# Patient Record
Sex: FEMALE | Race: WHITE | NOT HISPANIC OR LATINO | URBAN - METROPOLITAN AREA
[De-identification: names, ages, dates, MRNs, and addresses within clinical notes are randomized per-mention and may not be internally consistent; named-entity substitution may affect disease eponyms.]

---

## 2023-03-08 ENCOUNTER — OFFICE VISIT (OUTPATIENT)
Dept: OBSTETRICS AND GYNECOLOGY | Age: 38
End: 2023-03-08
Payer: COMMERCIAL

## 2023-03-08 VITALS
DIASTOLIC BLOOD PRESSURE: 66 MMHG | BODY MASS INDEX: 27.83 KG/M2 | HEIGHT: 61 IN | WEIGHT: 147.4 LBS | SYSTOLIC BLOOD PRESSURE: 118 MMHG

## 2023-03-08 DIAGNOSIS — N64.52 NIPPLE DISCHARGE: Primary | ICD-10-CM

## 2023-03-08 PROCEDURE — 99203 OFFICE O/P NEW LOW 30 MIN: CPT | Performed by: STUDENT IN AN ORGANIZED HEALTH CARE EDUCATION/TRAINING PROGRAM

## 2023-03-08 NOTE — PROGRESS NOTES
[facility]   Obstetrics and Gynecology   New Gynecology Visit    3/8/2023    Patient: Mar Paiz          MR#:[id number]    History of Present Illness    Chief Complaint   Patient presents with   • Establish Care     NGYN - Ref for right nipple discharge, Breast U/S done 2022, Dx MG 10/14/2022, Last pap  nml       37 y.o. female  who presents for presents with nipple discharge.  She was previously seen at [place] for this issue and wants a second opinion.  Started 10/2022.  She reports right breast infection that presented as painful breast with spontaneous bloody and then clear nipple discharge.   Symptoms resolved with antibiotics.  Breast imaging normal.  It then recurred in Dec and repeat imaging was still normal.  Stopped breastfeeding 1.5 yrs ago.    Studies reviewed:  US Breast Limited Right (2022 09:46)  US Breast Limited Right (10/14/2022 12:00)  Mammo Diagnostic Bilateral With CAD (10/14/2022 11:38)      Obstetric History:  OB History        4    Para   2    Term   1       1    AB   2    Living   2       SAB   1    IAB        Ectopic        Molar        Multiple        Live Births   2               Menstrual History:     Patient's last menstrual period was 2023 (exact date).       ________________________________________  There is no problem list on file for this patient.    Past Medical History:   Diagnosis Date   • No pertinent past medical history      Past Surgical History:   Procedure Laterality Date   •  SECTION      x2   • TUBAL ABDOMINAL LIGATION       Social History     Tobacco Use   Smoking Status Never   • Passive exposure: Never   Smokeless Tobacco Never     Family History   Problem Relation Age of Onset   • Prostate cancer Father    • No Known Problems Mother    • Breast cancer Neg Hx    • Ovarian cancer Neg Hx    • Uterine cancer Neg Hx    • Colon cancer Neg Hx      Prior to Admission medications    Not on File  "    ________________________________________    The following portions of the patient's history were reviewed and updated as appropriate: allergies, current medications, past family history, past medical history, past social history, past surgical history and problem list.    Review of Systems   All other systems reviewed and are negative.           Objective     /66   Ht 154.9 cm (61\")   Wt 66.9 kg (147 lb 6.4 oz)   LMP 02/27/2023 (Exact Date)   Breastfeeding No   BMI 27.85 kg/m²    BP Readings from Last 3 Encounters:   03/08/23 118/66      Wt Readings from Last 3 Encounters:   03/08/23 66.9 kg (147 lb 6.4 oz)        BMI: Estimated body mass index is 27.85 kg/m² as calculated from the following:    Height as of this encounter: 154.9 cm (61\").    Weight as of this encounter: 66.9 kg (147 lb 6.4 oz).    Physical Exam  Vitals and nursing note reviewed.   Constitutional:       General: She is not in acute distress.     Appearance: Normal appearance.   Pulmonary:      Effort: Pulmonary effort is normal. No respiratory distress.   Chest:   Breasts:     Right: Normal. No swelling, bleeding, inverted nipple, mass, nipple discharge, skin change or tenderness.      Left: Normal. No swelling, bleeding, inverted nipple, mass, nipple discharge, skin change or tenderness.   Lymphadenopathy:      Upper Body:      Right upper body: No axillary adenopathy.      Left upper body: No axillary adenopathy.   Neurological:      General: No focal deficit present.      Mental Status: She is alert and oriented to person, place, and time.   Psychiatric:         Mood and Affect: Mood normal.         Behavior: Behavior normal.         Thought Content: Thought content normal.         Judgment: Judgment normal.           Assessment:  Diagnoses and all orders for this visit:    1. Nipple discharge (Primary)    - We reviewed her previous imaging which was all very normal and reassuring.  Exam today normal.  No significant family history " of breast cancer.  - We discussed that risk of breast cancer at this point is very low.  She discussed these findings with Dr. Christianson, a diagnostic radiologist who specializes in breast imaging, at the [facility].  Regardless, I offered her referral to breast surgeon if she has any further concerns.  She declined for now but will let me know if she changes her mind.      Plan:  Return if symptoms worsen or fail to improve.    I spent 30 minutes caring for Mar Paiz on this date of service. This time includes time spent by me in the following activities: preparing for the visit, reviewing tests, obtaining and/or reviewing a separately obtained history, performing a medically appropriate examination and/or evaluation, counseling and educating the patient/family/caregiver, ordering medications, tests, or procedures and documenting information in the medical record.    Jennifer Razo MD  3/8/2023 13:29 EST

## 2023-03-09 ENCOUNTER — PATIENT ROUNDING (BHMG ONLY) (OUTPATIENT)
Dept: OBSTETRICS AND GYNECOLOGY | Age: 38
End: 2023-03-09
Payer: COMMERCIAL

## 2023-03-09 NOTE — PROGRESS NOTES
A MY CHART MESSAGE HAS BEEN SENT TO THE PATIENT FOR [facility] [place] [facility].